# Patient Record
(demographics unavailable — no encounter records)

---

## 2022-06-22 RX ORDER — LOSARTAN POTASSIUM 100 MG/1
TABLET ORAL
COMMUNITY
Start: 2020-11-04

## 2022-06-22 RX ORDER — DEXLANSOPRAZOLE 60 MG/1
1 CAPSULE, DELAYED RELEASE ORAL
COMMUNITY
Start: 2013-01-25

## 2022-06-22 RX ORDER — GABAPENTIN 100 MG/1
1 CAPSULE ORAL
COMMUNITY
Start: 2021-10-14

## 2022-06-22 RX ORDER — DIVALPROEX SODIUM 500 MG/1
TABLET, DELAYED RELEASE ORAL
COMMUNITY

## 2022-06-22 RX ORDER — ATORVASTATIN CALCIUM 10 MG/1
TABLET, FILM COATED ORAL
COMMUNITY
Start: 2017-05-31

## 2022-06-22 RX ORDER — INSULIN GLARGINE 100 [IU]/ML
INJECTION, SOLUTION SUBCUTANEOUS
COMMUNITY
End: 2022-10-11 | Stop reason: SDUPTHER

## 2022-06-22 RX ORDER — INSULIN LISPRO 100 [IU]/ML
INJECTION, SOLUTION INTRAVENOUS; SUBCUTANEOUS
COMMUNITY
Start: 2021-08-02

## 2022-06-22 RX ORDER — MELOXICAM 7.5 MG/1
TABLET ORAL
COMMUNITY

## 2022-06-22 RX ORDER — INSULIN DEGLUDEC 200 U/ML
INJECTION, SOLUTION SUBCUTANEOUS
COMMUNITY
Start: 2021-05-05 | End: 2022-10-11 | Stop reason: SDUPTHER

## 2022-06-22 RX ORDER — DULOXETIN HYDROCHLORIDE 30 MG/1
1 CAPSULE, DELAYED RELEASE ORAL
COMMUNITY

## 2022-06-22 RX ORDER — INSULIN DETEMIR 100 [IU]/ML
INJECTION, SOLUTION SUBCUTANEOUS
COMMUNITY
Start: 2021-02-25

## 2022-06-22 RX ORDER — ATORVASTATIN CALCIUM 40 MG/1
TABLET, FILM COATED ORAL
COMMUNITY
Start: 2015-08-18

## 2022-06-22 RX ORDER — IBUPROFEN 800 MG/1
TABLET ORAL
COMMUNITY
Start: 2020-11-04

## 2022-06-22 RX ORDER — DULOXETIN HYDROCHLORIDE 60 MG/1
1 CAPSULE, DELAYED RELEASE ORAL
COMMUNITY
Start: 2013-12-05

## 2022-06-22 RX ORDER — GABAPENTIN 300 MG/1
3 CAPSULE ORAL
COMMUNITY

## 2022-06-22 RX ORDER — GABAPENTIN 800 MG/1
TABLET ORAL
COMMUNITY
Start: 2021-10-14

## 2022-06-22 RX ORDER — LAMOTRIGINE 100 MG/1
TABLET ORAL
COMMUNITY
Start: 2014-11-05

## 2022-06-22 RX ORDER — ATOMOXETINE 25 MG/1
CAPSULE ORAL
COMMUNITY

## 2022-06-22 RX ORDER — AMITRIPTYLINE HYDROCHLORIDE 10 MG/1
TABLET, FILM COATED ORAL
COMMUNITY
Start: 2014-11-12

## 2022-06-22 RX ORDER — INSULIN LISPRO 100 [IU]/ML
INJECTION, SOLUTION INTRAVENOUS; SUBCUTANEOUS
COMMUNITY
Start: 2020-12-28

## 2022-06-22 RX ORDER — FLUOXETINE HYDROCHLORIDE 20 MG/1
CAPSULE ORAL
COMMUNITY
Start: 2015-05-19

## 2022-06-22 RX ORDER — HYDROXYZINE PAMOATE 25 MG/1
CAPSULE ORAL
COMMUNITY

## 2022-06-22 RX ORDER — LISINOPRIL 5 MG/1
TABLET ORAL
COMMUNITY

## 2022-06-22 RX ORDER — HYDROXYZINE HYDROCHLORIDE 25 MG/1
TABLET, FILM COATED ORAL
COMMUNITY
Start: 2013-01-28

## 2022-06-22 RX ORDER — MIRTAZAPINE 30 MG/1
TABLET, FILM COATED ORAL
COMMUNITY
Start: 2022-03-29

## 2022-06-22 RX ORDER — INSULIN GLARGINE 100 [IU]/ML
INJECTION, SOLUTION SUBCUTANEOUS
COMMUNITY
Start: 2021-02-25

## 2022-06-22 RX ORDER — AMLODIPINE BESYLATE 5 MG/1
TABLET ORAL
COMMUNITY
Start: 2020-11-04

## 2022-06-28 RX ORDER — RISPERIDONE 1 MG/1
TABLET, FILM COATED ORAL
COMMUNITY

## 2022-06-28 RX ORDER — QUETIAPINE FUMARATE 25 MG/1
TABLET, FILM COATED ORAL
COMMUNITY

## 2022-06-28 RX ORDER — OXYCODONE AND ACETAMINOPHEN 10; 325 MG/1; MG/1
TABLET ORAL
COMMUNITY
Start: 2012-10-08

## 2022-06-28 RX ORDER — OMEPRAZOLE 40 MG/1
CAPSULE, DELAYED RELEASE ORAL
COMMUNITY
Start: 2020-11-04

## 2022-06-28 RX ORDER — OXCARBAZEPINE 150 MG/1
TABLET, FILM COATED ORAL
COMMUNITY
Start: 2014-11-05

## 2022-06-28 RX ORDER — OLANZAPINE 10 MG/1
TABLET ORAL
COMMUNITY
Start: 2018-12-06

## 2022-06-28 RX ORDER — OXYCODONE HYDROCHLORIDE 15 MG/1
TABLET ORAL
COMMUNITY
Start: 2012-11-13

## 2022-06-28 RX ORDER — OLANZAPINE AND FLUOXETINE 6; 25 MG/1; MG/1
CAPSULE ORAL
COMMUNITY
Start: 2021-04-16

## 2022-06-28 RX ORDER — OXCARBAZEPINE 300 MG/1
TABLET, FILM COATED ORAL
COMMUNITY

## 2022-07-02 RX ORDER — TIZANIDINE 4 MG/1
TABLET ORAL
COMMUNITY

## 2022-07-02 RX ORDER — TADALAFIL 5 MG/1
TABLET ORAL
COMMUNITY

## 2022-07-02 RX ORDER — ROSUVASTATIN CALCIUM 20 MG/1
TABLET, COATED ORAL
COMMUNITY
Start: 2015-06-25

## 2022-07-02 RX ORDER — ROSUVASTATIN CALCIUM 5 MG/1
TABLET, COATED ORAL
COMMUNITY
Start: 2018-03-27

## 2022-07-21 LAB
ANION GAP SERPL CALC-SCNC: 13 MMOL/L (ref 2–17)
BASOPHILS # BLD AUTO: 0.1 X10E3/MCL (ref 0–0.2)
BASOPHILS NFR BLD AUTO: 0.4 % (ref 0–2)
BUN SERPL-MCNC: 33 MG/DL (ref 6–20)
CALCIUM SERPL-MCNC: 8.6 MG/DL (ref 8.6–10)
CHLORIDE SERPL-SCNC: 105 MMOL/L (ref 98–107)
CREAT SERPL-MCNC: 1.9 MG/DL (ref 0.7–1.3)
DEPRECATED HCO3 PLAS-SCNC: 21 MMOL/L (ref 22–29)
EOSINOPHIL # BLD AUTO: 0.2 X10E3/MCL (ref 0–0.5)
EOSINOPHIL NFR BLD AUTO: 1.4 % (ref 0–7)
ERYTHROCYTE [DISTWIDTH] IN BLOOD BY AUTOMATED COUNT: 16.6 % (ref 11–16)
GFR SERPL CREATININE-BSD FRML MDRD: 42 ML/MIN/1.73M²
GLUCOSE SERPL-MCNC: 57 MG/DL (ref 70–99)
HCT VFR BLD AUTO: 25.4 % (ref 38–52)
HGB BLD-MCNC: 7.9 G/DL (ref 13–17.3)
IMMATURE GRANS (ABS): 0.11 X10E3/MCL (ref 0–0.06)
IMMATURE GRANULOCYTES: 0.8 % (ref 0–0.6)
LYMPHOCYTES # SPEC AUTO: 1.1 X10E3/MCL (ref 1–3.2)
LYMPHOCYTES NFR BLD AUTO: 7.8 % (ref 15–45)
MCH RBC QN AUTO: 25.3 PG (ref 27–34.5)
MCHC RBC AUTO-ENTMCNC: 31.1 G/DL (ref 32–36)
MCV RBC AUTO: 81.4 FL (ref 84–100)
MONOCYTES # BLD AUTO: 0.9 X10E3/MCL (ref 0.3–1)
MONOCYTES NFR BLD AUTO: 6.2 % (ref 4–12)
NEUTROPHILS # BLD AUTO: 11.6 X10E3/MCL (ref 1.6–7.3)
NEUTROPHILS NFR BLD AUTO: 83.4 % (ref 42–74)
OSMOLALITY SERPL CALC.SUM OF ELEC: 282 MOSM/KG (ref 270–287)
PLATELET # BLD AUTO: 444 X10E3/MCL (ref 140–440)
PMV BLD AUTO: 9.2 FL (ref 7.2–13.2)
POTASSIUM SERPL-SCNC: 4.2 MMOL/L (ref 3.5–5.3)
RBC # BLD AUTO: 3.12 X10E6/MCL (ref 4–5.6)
SODIUM SERPL-SCNC: 139 MMOL/L (ref 135–145)
WBC # BLD AUTO: 13.8 X10E3/MCL (ref 3.8–10.6)

## 2022-10-22 NOTE — ED NOTES
ED Patient Education Note     Patient Education Materials Follows:  Dermatology     How to Change Your Wound Dressing    A dressing is a material that is placed in and over a wound to help the wound heal. It protects the wound from bacteria, which could cause the wound to become infected. A dressing also protects the wound from further injury and keeps it from becoming too dry or too wet. There are several types of wound dressings. Some examples are:   Bandages. Gauze pads. Foam pads. Antimicrobial dressings. These dressings prevent or treat infection and may contain an antiseptic such as silver or iodine. Calcium alginate. These are general guidelines. Follow your health care provider's instructions about what dressing supplies to use and how to change your wound dressing. What are the risks? It is usually safe to use and change a dressing. The most common problem is skin irritation or a rash from the adhesive tape that is used with the dressing. However, more serious problems can develop, such as:   Bleeding. Infection. Supplies needed:    Set up a clean area for wound care. You will need:   A disposable garbage bag that is open and ready to use. Hand . Recommended cleaning solution as told by your health care provider, such as:  ? Germ-free (sterile) water. ? Sterile salt water (saline). ? Wound cleanser. New wound dressing material. Make sure to open the dressing package so the dressing remains on the inside of the package. You may also need the following in your clean area:   A box of vinyl gloves. Adhesive tape. Adhesive remover. Skin protectant. This may be a wipe, film, or spray. Clean or sterile scissors. A cotton-tipped applicator. How to change your dressing    How often you change your dressing will depend on your wound. Change the dressing as often as told by your health care provider.  Your health care provider may change your dressing, or a family member, friend, or caregiver may be shown how to change the dressing. It is important to: Wash your hands before and after each dressing change. If soap and water are not available, use hand . Wear gloves and protective clothing while changing a dressing. This may include eye protection. Never let anyone change your dressing if he or she has an infection, a skin condition, or a skin wound or cut of any size. Preparing to change your dressing     Take a shower before you do the first dressing change of the day. If your health care provider does not want your wound to get wet and your dressing is not waterproof, you may need to apply plastic leak-proof sealing wrap over your dressing for protection. If needed, take pain medicine 30 minutes before the dressing change as told by your health care provider. Removing your old dressing       Wash your hands with soap and water. Dry your hands with a clean towel. If soap and water are not available, use hand . Go to the clean area that you have set up with all the supplies you need. If you are using gloves, put the gloves on before you remove the dressing. Gently remove any adhesive or tape by pulling it off in the direction of your hair growth. Only touch the outside edges of the dressing. ? If you are told to use an adhesive remover to loosen the edges of the dressing, make sure to avoid the wound area. Take off the dressing. If the dressing sticks to your skin, use the recommended cleaning solution to wet the dressing. This helps it come off more easily. Remove any gauze or packing in your wound. Throw the old dressing supplies into the garbage bag. Remove each glove by grabbing the cuff with the opposite hand and turning the glove inside out. Place the gloves in the trash immediately. Wash your hands with soap and water.  Dry your hands with a clean towel. If soap and water are not available, use hand . Cleaning your wound     Follow instructions from your health care provider about how to clean your wound. This may include using the recommended cleaning solution. ? You may need to use sterile water to clean your wound if you are applying a dressing that has silver in it. Do not use over-the-counter medicated or antiseptic creams, sprays, liquids, or dressings unless told to do so by your health care provider. Use a clean gauze pad to clean the area thoroughly with the recommended cleaning solution. Throw the gauze pad into the garbage bag. Wash your hands with soap and water. Dry your hands with a clean towel. If soap and water are not available, use hand . Applying the wound dressing     If your health care provider recommended a skin protectant, apply it to the skin around the wound. Gently pack, if needed, and cover the wound with the recommended dressing. Make sure to touch only the outside edges of the dressing. Do not touch the inside of the dressing. Secure the dressing so all sides stay in place. You may do this with medical adhesive, roll gauze, or tape. If you use tape, do not wrap the tape all the way around your arm or leg. Take off your gloves. Put them into the garbage bag with the old dressing. Tie the bag shut and throw it away. Wash your hands with soap and water. Dry your hands with a clean towel. If soap and water are not available, use hand . Follow these instructions at home:    Wound care       Check your wound every day for signs of infection, or as often as told by your health care provider. Check for:  ? More redness, swelling, or pain. ? More fluid or blood. ? Warmth. ? Pus or a bad smell. General instructions     Take over-the-counter and prescription medicines only as told by your health care provider.      Ask your health care provider if the medicine prescribed to you:  ? Requires you to avoid driving or using heavy machinery. ? Can cause constipation. You may need to take actions to prevent or treat constipation, such as:  ? Drink enough fluid to keep your urine pale yellow. ? Take over-the-counter or prescription medicines. ? Eat foods that are high in fiber, such as beans, whole grains, and fresh fruits and vegetables. ? Limit foods that are high in fat and processed sugars, such as fried or sweet foods. Keep all follow-up visits as told by your health care provider. This is important. Contact a health care provider if:     You have new pain. You develop irritation, a rash, or itching around the wound or dressing. Changing your dressing causes pain or a lot of bleeding. Get help right away if you have:     Severe pain. Signs of infection, such as:  ? More redness, swelling, or pain. ? More fluid or blood. ? Warmth. ? Pus or a bad smell. ? Red streaks leading from the wound. ? A fever. Summary     A dressing is a material that is placed in and over a wound. A dressing helps your wound heal.     Wash your hands before and after each dressing change. If soap and water are not available, use hand . Follow your health care provider's instructions about what dressing supplies to use and how to change your wound dressing. Check your wound every day for signs of infection, or as often as told by your health care provider. Get help right away if you have severe pain or signs of infection. This information is not intended to replace advice given to you by your health care provider. Make sure you discuss any questions you have with your health care provider. Document Revised: 08/15/2019 Document Reviewed: 08/15/2019  [employer] Patient Education ?  [id number] [address].      Infectious Disease     Osteomyelitis, Adult      Bone infections, also called osteomyelitis,occur when bacteria or other germs get inside a bone. This can happen if you have an infection in another part of your body that spreads through your blood. It can also happen if you have a wound or a broken bone (fracture) that breaks the skin. A wound or a fracture can allow germs from your skin or from outside of your body to spread to your bone. Bone infections need to be treated quickly to:   Prevent bone damage. Prevent the infection from spreading to other areas of your body. What are the causes? Most bone infections are caused by bacteria. The most common bacteria is one found on the skin (staphylococcus). Bone infections can also be caused by other germs, such as viruses and funguses. What increases the risk? You are more likely to develop this condition if:   You recently had surgery, especially bone or joint surgery. You have had an injury, such as stepping on a nail or having a fracture that exposes bones through the skin. You have a long-term (chronic) disease, such as:  ? Diabetes. ? HIV (human immunodeficiency virus). ? Rheumatoid arthritis. ? Sickle cell anemia. ? Kidney disease that requires dialysis. You have a condition that affects your body's defense system (immune system) or you take medicines that block or weaken the immune system. You have a condition that reduces your blood flow. You have an artificial joint. You have had a joint or bone repaired with plates or screws. You use IV drugs. What are the signs or symptoms? Symptoms vary depending on the type and location of your infection. Common symptoms of bone infections include:   Fever and chills. Skin redness and warmth. Swelling. Pain and stiffness. Drainage of fluid or pus near the infection. How is this diagnosed? This condition may be diagnosed based on: Your symptoms and medical history.      A physical exam.     Tests, such as:  ? A sample of tissue, fluid, or blood taken to be examined under a microscope. ? Pus or discharge swabbed from a wound for testing. This is to identify the type of germs and to determine what type of medicine will kill them. ? Blood tests. Imaging studies, including X-rays, MRI, CT scan, bone scan, or ultrasound. How is this treated? Treatment for this condition depends on the cause and type of infection. Antibiotic medicines are usually the first treatment for a bone infection. This may be done in a hospital at first. You may have to continue IV antibiotics at home or take antibiotics by mouth for several weeks after that. Other treatments may include surgery to:   Remove dead or dying tissue from a bone. Remove an infected artificial joint. Remove infected plates or screws that were used to repair a broken bone. Follow these instructions at home:    Medicines     Take over-the-counter and prescription medicines as told by your health care provider. Finish all antibiotic medicine even if you start to feel better. Follow instructions from your health care provider about how to take IV antibiotics at home. You may need to have a nurse come to your home to give you the IV antibiotics. Managing pain, stiffness, and swelling      If directed, put ice on the affected area. To do this:   Put ice in a plastic bag. Place a towel between your skin and the bag. Leave the ice on for 20 minutes, 2?3 times a day. General instructions     Ask your health care provider if you have any restrictions on your activities. Do not use any products that contain nicotine or tobacco, such as cigarettes, e-cigarettes, and chewing tobacco. If you need help quitting, ask your health care provider. Keep all follow-up visits as told by your health care provider. This is important. How is this prevented? Wash your hands often to stop the spread of germs. Wash your hands for at least 20 seconds with soap and water. If soap and water are not available, use hand . Keep any open areas, cuts, or wounds clean. Apply a clean bandage after cleaning the area. Check wounds frequently for signs of infections. Signs of infection include redness, swelling, warmth, pus, or a bad smell. Wear proper footwear to avoid injuries to the feet. Contact a health care provider if:     You develop a fever or chills. You have redness, warmth, pain, or swelling that returns after treatment. Get help right away if:     You have rapid breathing or you have trouble breathing. You have chest pain. You cannot drink fluids or make urine. The affected area swells, changes color, or turns blue. You have numbness or severe pain in the affected area. These symptoms may represent a serious problem that is an emergency. Do not wait to see if the symptoms will go away. Get medical help right away. Call your local emergency services (911 in the [place] Do not drive yourself to the hospital.      Summary     Bone infections, also called osteomyelitis,occur when bacteria or other germs get inside a bone. You are more likely to get this type of infection if you have a condition that lowers your ability to fight infections. You are also likely to get this condition if you take medicines that block or weaken the immune system. Most bone infections are caused by bacteria. They can also be caused by other germs, such as viruses and funguses. Treatment for this condition usually starts with taking antibiotics. Further treatment depends on the cause and type of infection. This information is not intended to replace advice given to you by your health care provider. Make sure you discuss any questions you have with your health care provider. Document Revised: 03/04/2021 Document Reviewed: 03/04/2021  [employer] Patient Education ?  2021 [employer]

## 2022-10-22 NOTE — DISCHARGE SUMMARY
ED Clinical Summary                         [facility]  [address]  [place], [place], [address] [phone]           PERSON INFORMATION  Name: Natasha Paiz Age:  48 Years : 1968   Sex: Male Language: English PCP: Amita Qureshi, [address]   Marital Status:   Phone: [phone] Med Service: MED-Medicine   MRN:  [id number] Acct# [de-identified] Arrival: 2022 14:36:00   Visit Reason: Chills; Leg pain-swelling; Malaise; Chills; INFECTION IN STUMP Acuity: 3 LOS: 000 02:21   Address:      [address] [place] [address] DR Brooklynn Alvarado [address]  Diagnosis:      BKA stump complication  Printed Prescriptions: Allergies      No Known Allergies      Medications Administered During Visit:                  Medication Dose Route   ondansetron 4 mg IV Push       Patient Medication List:              atorvastatin (Lipitor 80 mg oral tablet) 1 Tabs Oral (given by mouth) Once a Day (at bedtime). famotidine (Pepcid 20 mg oral tablet) 1 Tabs Oral (given by mouth) every day. FLUoxetine (PROzac 40 mg oral capsule) 1 Capsules Oral (given by mouth) every day.  gabapentin (Neurontin 300 mg oral capsule) 3 Capsules Oral (given by mouth) 2 times a day. HYDROcodone-acetaminophen (Norco 325 mg-5 mg oral tablet) 1 Tabs Oral (given by mouth) every 6 hours as needed moderate pain (4-7). 40 MEDD. Refills: 0.  hydrOXYzine (hydrOXYzine hydrochloride 25 mg oral tablet) 1-2 tabs Oral (given by mouth) 3 times a day as needed for anxiety., SOUND ALIKE / LOOK ALIKE - VERIFY DRUG  insulin lispro (Admelog SoloStar 100 units/mL injectable solution) 10 Units Subcutaneous (under the skin) 3 times a day before meals. losartan (losartan 100 mg oral tablet) 1 Tabs Oral (given by mouth) every day.   meloxicam (meloxicam 7.5 mg oral tablet) 1 Tabs Oral (given by mouth) 2 times a day.  nalOXone (Narcan 4 mg/0.1 mL nasal spray) 4 Milligram Nasal (into the nose) every 2 minutes as needed overdose. 1 spray (4 mg) every 2-3 minutes until patient responds or EMS arrives. nicotine (nicotine 4 mg oral transmucosal gum) 1 Each Chewed every 2 hours as needed for smoking cessation. OLANZapine (OLANZapine 10 mg oral tablet) 1 Tabs Oral (given by mouth) every day. omeprazole (omeprazole 40 mg oral delayed release capsule) 1 Capsules Oral (given by mouth) every day. QUEtiapine (SEROquel 25 mg oral tablet) 2 Tabs Oral (given by mouth) every day., SOUND ALIKE / LOOK ALIKE - VERIFY DRUG  tiZANidine (tiZANidine 4 mg oral tablet) 1 Tabs Oral (given by mouth) 3 times a day. Major Tests and Procedures: The following procedures and tests were performed during your ED visit. COMMONPROCEDURES%>  COMMON PROCEDURESCOMMENTS%>          Laboratory Orders  Name Status Details   BMP Completed Blood, Stat, ST - Stat, 07/21/22 15:22:00 EDT, 07/21/22 15:22:00 EDT, Nurse HEIDI strange LINDSEY K-PA, Print label Y/N   CBCDIFF Completed Blood, Stat, ST - Stat, 07/21/22 15:22:00 EDT, 07/21/22 15:22:00 EDT, Nurse Edith strange, Print label Y/N               Radiology Orders  No radiology orders were placed. Patient Care Orders  Name Status Details   Discharge Patient Ordered 07/21/22 16:39:00 EDT   ED Assessment Adult Completed 07/21/22 14:41:36 EDT, 07/21/22 14:41:36 EDT   ED Secondary Triage Completed 07/21/22 14:41:36 EDT, 07/21/22 14:41:36 EDT   ED Tracking - MRSA Ordered 07/21/22 14:36:50 EDT, NOT VALID FOR pharmacy, laboratory, radiology. , 07/21/22 14:36:50 EDT   ED Triage Adult Completed 07/21/22 14:36:50 EDT, 07/21/22 14:36:50 EDT   POC-Blood Glucose Monitoring Ordered 07/21/22 15:22:00 EDT, Once, 07/21/22 15:22:00 EDT   Saline Lock Insert Completed 07/21/22 15:22:00 EDT, Once, 07/21/22 15:22:00 EDT   Wound Care Ordered 07/21/22 15:36:00 EDT, Once, 07/21/22 15:36:00 EDT, Dress wound(s)             PROVIDER INFORMATION               Provider Role Assigned Unassigned   HEIDI Geneva YE ED MidLevel 2022 14:54:20    Mary Brown RN, Altgogo Jones ED Nurse 2022 15:25:46        Attending Physician:  Jordan YE     Admit Doc  JUAQUIN GORDON     Consulting Doc       VITALS INFORMATION  Vital Sign Triage Latest   Temp Oral ORAL_1%>36.8 degC ORAL%>36.8 degC   Temp Temporal TEMPORAL_1%> TEMPORAL%>   Temp Intravascular INTRAVASCULAR_1%> INTRAVASCULAR%>   Temp Axillary AXILLARY_1%> AXILLARY%>   Temp Rectal RECTAL_1%> RECTAL%>   02 Sat 99 % 98 %   Respiratory Rate RATE_1%>15 br/min RATE%>16 br/min   Peripheral Pulse Rate PULSE RATE_1%>80 bpm PULSE RATE%>80 bpm   Apical Heart Rate HEART RATE_1%> HEART RATE%>   Blood Pressure BLOOD PRESSURE_1%>/ BLOOD PRESSURE_1%>93 mmHg BLOOD PRESSURE%>138 mmHg / BLOOD PRESSURE%>88 mmHg                 Immunizations      No Immunizations Documented This Visit          DISCHARGE INFORMATION   Discharge Disposition: H Outpt-Sent Home   Discharge Location:    Home   Discharge Date and Time:    2022 16:57:43   ED Checkout Date and Time:    2022 16:57:43     DEPART REASON INCOMPLETE INFORMATION               Depart Action Incomplete Reason   Interactive View/I&O Recently assessed               Problems      Active           Suicide risk              Smoking Status      10 or more cigarettes (1/2 pack or more)/day in last 30 days         PATIENT EDUCATION INFORMATION  Instructions:       Osteomyelitis, Adult; How to Change Your Wound Dressing     Follow up:                    With: Address: When:   Dr. Stuart Bro  Within 1 week   Comments: Follow-up with Dr. Daphney Alvarenga, our general surgeon or the surgeon you were referred to at [facility].        With: Address: When:   [address]  [phone] Business () Within 1 week           ED PROVIDER DOCUMENTATION     Patient:   Brandie Taveras            MRN: [id number]            FIN: [id number]               Age:   48 years     Sex:  Male     : 1968   Associated Diagnoses:   BKA stump complication   Author:   Richie YE      Basic Information   Time seen: Provider Seen ()   ED Provider/Time:    JUAQUIN GORDON / 07/21/2022 14:54  . Additional information: Chief Complaint from Nursing Triage Note   Chief Complaint  Chief Complaint: arrives with PICC in [place] r/t left BKA. treated at [facility]. states not feeling well. having chills, fever, sweats, and nausea (07/21/22 14:38:00). History of Present Illness   This is a 51-year-old male presenting today for chills, nausea and leg pain. Patient was actually recently seen here 2 days ago for similar complaints. He was admitted to [address] [place] [address] for osteomyelitis of his left lower extremity stump. At the time of his admission. So he was given the option of stump amputation versus outpatient IV antibiotics. It appears as though he decided to not proceed while admitted with amputation but there is back commended to follow-up with orthopedics outpatient. When he was here 2 days ago he states he would rather be seen at [facility] for this. He was given follow-up information for general surgery, records were sent over for this consultation. He states he has not spoken with Dr. Miguel Gordon yet but reports worsening pain, nausea and feeling weaker today. He has not had any fevers. He has been compliant with his IV antibiotics and his PICC line, vancomycin and Rocephin. .        Review of Systems   Constitutional symptoms:  No fever,    Respiratory symptoms:  No shortness of breath,    Cardiovascular symptoms:  No chest pain,    Gastrointestinal symptoms:  No vomiting,    Neurologic symptoms:  No headache, no dizziness. Additional review of systems information: All other systems reviewed and otherwise negative. Health Status   Allergies: Allergic Reactions (Selected)  No Known Allergies.    Medications:  (Selected)   Inpatient Medications  Ordered  Zofran: 4 mg, 2 mL, IV Push, Once  Prescriptions  Prescribed  Norco 325 mg-5 mg oral tablet: 1 tabs, Oral, q6hr, 40 MEDD, PRN: moderate pain (4-7), 15 tabs, 0 Refill(s)  Documented Medications  Documented  Admelog SoloStar 100 units/mL injectable solution: 10 units, Subcutaneous, TIDAC, 0 Refill(s)  Lipitor 80 mg oral tablet: 80 mg, 1 tabs, Oral, Once a Day (at bedtime), 0 Refill(s)  Narcan 4 mg/0.1 mL nasal spray: 4 mg, Nasal, q2min, 1 spray (4 mg) every 2-3 minutes until patient responds or EMS arrives, PRN: overdose, 1 EA, 0 Refill(s)  Neurontin 300 mg oral capsule: 900 mg, 3 caps, Oral, BID, 0 Refill(s)  OLANZapine 10 mg oral tablet: 10 mg, 1 tabs, Oral, Daily, 30 tabs, 0 Refill(s)  PROzac 40 mg oral capsule: 40 mg, 1 caps, Oral, Daily, 30 caps, 0 Refill(s)  Pepcid 20 mg oral tablet: 20 mg, 1 tabs, Oral, Daily, 30 tabs, 0 Refill(s)  SEROquel 25 mg oral tablet: 50 mg, 2 tabs, Oral, Daily, 0 Refill(s)  hydrOXYzine hydrochloride 25 mg oral tablet: 1-2 tabs, Oral, TID, PRN: for anxiety, 0 Refill(s)  losartan 100 mg oral tablet: 100 mg, 1 tabs, Oral, Daily, 30 tabs, 0 Refill(s)  meloxicam 7.5 mg oral tablet: 7.5 mg, 1 tabs, Oral, BID, 30 tabs, 0 Refill(s)  nicotine 4 mg oral transmucosal gum: 4 mg, 1 EA, Chewed, q2hr, PRN: for smoking cessation, 0 Refill(s)  omeprazole 40 mg oral delayed release capsule: 40 mg, 1 caps, Oral, Daily, 0 Refill(s)  tiZANidine 4 mg oral tablet: 4 mg, 1 tabs, Oral, TID, 0 Refill(s). Past Medical/ Family/ Social History   Medical history: Reviewed as documented in chart. Surgical history: Reviewed as documented in chart. Family history: Not significant. Social history: Reviewed as documented in chart. Problem list:    Active Problems (8)  BKA stump complication   CVA (cerebral vascular accident)   Diabetes   HLD (hyperlipidemia)   Hyperlipidemia   Hypertension   MRSA (methicillin resistant Staphylococcus aureus)   Suicide risk   .       Physical Examination               Vital Signs   Vital Signs 7/03/5062 57:96 EDT Systolic Blood Pressure 335 mmHg  HI    Diastolic Blood Pressure 93 mmHg  HI    Temperature Oral 36.8 degC    Heart Rate Monitored 81 bpm    Respiratory Rate 15 br/min    SpO2 99 %   . Measurements   7/21/2022 14:41 EDT Body Mass Index est neo 24.06 kg/m2    Body Mass Index Measured 24.06 kg/m2   7/21/2022 14:38 EDT Height/Length Measured 179 cm    Weight Dosing 77.1 kg   . General:  Alert, no acute distress. Skin:  Warm, dry, intact. Head:  Normocephalic, atraumatic. Neck:  Supple, trachea midline. Eye:  Normal conjunctiva. Cardiovascular:  Regular rate and rhythm, No murmur. Respiratory:  Lungs are clear to auscultation, respirations are non-labored, breath sounds are equal, Symmetrical chest wall expansion. Musculoskeletal:  Prior BKA of left lower extremity. Patient has an open wound measuring about 8 x 2 cm, there is no purulent drainage, no surrounding erythema. .   Neurological:  Alert and oriented to person, place, time, and situation, normal sensory observed, normal motor observed, normal speech observed. Psychiatric:  Cooperative, appropriate mood & affect. Medical Decision Making   Rationale:  PA/NP reviewed with co-signing physician: diagnosis and plan of care. Documents reviewed:  Emergency department nurses' notes, flowsheet, emergency department records, prior records, vital signs.     Results review:  Lab results : Lab View   7/21/2022 16:03 EDT Estimated Creatinine Clearance 47.12 mL/min   7/21/2022 15:36 EDT WBC 13.8 x10e3/mcL  HI    RBC 3.12 x10e6/mcL  LOW    Hgb 7.9 g/dL  LOW    HCT 25.4 %  LOW    MCV 81.4 fL  LOW    MCH 25.3 pg  LOW    MCHC 31.1 g/dL  LOW    RDW 16.6 %  HI    Platelet 548 [id number]/INR  HI    MPV 9.2 fL    Neutro Auto 83.4 %  HI    Neutro Absolute 11.6 x10e3/mcL  HI    Immature Grans Percent 0.8 %  HI    Immature Grans Absolute 0.11 x10e3/mcL  HI    Lymph Auto 7.8 %  LOW    Lymph Absolute 1.1 x10e3/mcL    Mono Auto 6.2 % Mono Absolute 0.9 x10e3/mcL    Eosinophil Percent 1.4 %    Eos Absolute 0.2 x10e3/mcL    Basophil Auto 0.4 %    Baso Absolute 0.1 x10e3/mcL    Sodium Lvl 139 mmol/L    Potassium Lvl 4.2 mmol/L    Chloride 105 mmol/L    CO2 21 mmol/L  LOW    Glucose Random 57 mg/dL  LOW    BUN 33 mg/dL  HI    Creatinine Lvl 1.9 mg/dL  HI    AGAP 13 mmol/L    Osmolality Calc 282 mOsm/kg    Calcium Lvl 8.6 mg/dL    eGFR 42 mL/min/1.73mÂ²  LOW   7/21/2022 14:41 EDT Estimated Creatinine Clearance 55.95 mL/min   . Reexamination/ Reevaluation   Notes: 1year-old male with a history of hypertension, CVA, polysubstance abuse, diabetes and chronic kidney disease presenting today for reevaluation of left lower extremity stump infection. Patient was admitted to [address] [place] [address] last week for osteomyelitis of lower left extremity stump. At the time patient declined amputation of the stump and was put on Vanco and Rocephin via PICC line. Patient reports that he decided he did not want the amputation, was given outpatient follow-up with orthopedics at [address] [place] [address]. He was seen here 2 days ago stating he wanted his care at [facility]. He was given general surgery follow-up however he has not seen them yet. States he has been nauseous, feeling weak. He is afebrile, hemodynamically stable arrival, comfortable, nontoxic-appearing. Stump with open wound, no purulent drainage or cellulitis evident. Patient's labs are similar to when he was last discharged from [facility] via comparison through [facility]. Pliant with his IV antibiotics via PICC line, states he has pain medication and nausea medicine at home. Strongly encouraged him to follow-up with his choice of surgeon, general surgery here or he can go back to his orthopedic surgeon at [address] [place] [address]. He agrees with this plan and stable for discharge. .      Impression and Plan   Diagnosis   BKA stump complication (LBS35-BY X92.3, Discharge, Medical)   Plan   Condition: Stable.     Disposition: Discharged: Time 7/21/2022 16:35:00, to home. Patient was given the following educational materials: How to Change Your Wound Dressing, Osteomyelitis, Adult. Follow up with: Erasto Mitchell Within 1 week; Dr. Estefani Bonilla Within 1 week Follow-up with Dr. Ami Amador, our general surgeon or the surgeon you were referred to at [facility]. .    Counseled: I had a detailed discussion with the patient and/or guardian regarding the historical points/exam findings supporting the discharge diagnosis and need for outpatient followup. Discussed the need to return to the ER if symptoms persist/worsen, or for any questions/concerns that arise at home.

## 2022-10-22 NOTE — ED NOTES
ED Patient Summary       ;          [facility]  [address], [place], [address] [place] [address]  [phone]  Discharge Instructions (Patient)  Ahmet Carey  :  1968                   MRN: [id number]                   FIN: [id number]  Reason For Visit: Chills; Leg pain-swelling; Malaise; Chills; INFECTION IN STUMP  Final Diagnosis: BKA stump complication     Visit Date: 2022 14:36:00  Address: [address] [place] [address]  Phone: [phone]     Emergency Department Providers:         Primary Physician:      Lamin Ace 61 would like to thank you for allowing us to assist you with your healthcare needs. The following includes patient education materials and information regarding your injury/illness. Follow-up Instructions: You were seen today on an emergency basis. Please contact your primary care doctor for a follow up appointment. If you received a referral to a specialist doctor, it is important you follow-up as instructed. It is important that you call your follow-up doctor to schedule and confirm the location of your next appointment. Your doctor may practice at multiple locations. The office location of your follow-up appointment may be different to the one written on your discharge instructions. If you do not have a primary care doctor, please call [phone] for help in finding a [place]. [facility] Provider. For help in finding a specialist doctor, please call [phone]. If your condition gets worse before your follow-up with your primary care doctor or specialist, please return to the Emergency Department. Coronavirus 2019 (COVID-19) Reminders:     Patients age 15 - 24, with parental consent, and over age 25 can make an appointment for a COVID-19 vaccine.  Patients can contact their [facility] Moss Physician [facility] doctors' offices to schedule an appointment to receive the COVID-19 vaccine. Patients who do not have a Marvia Hammans physician can call [phone] to schedule vaccination appointments. Follow Up Appointments:  Primary Care Provider:     Name: Nuvia Champagne, [address]     Phone:                  With: Address: When:   Dr. Derek Gomez  Within 1 week   Comments: Follow-up with Dr. Grover Siu, our general surgeon or the surgeon you were referred to at [facility]. With: Address: When:   [address]  [phone] Business ([employer] Within 1 week              [address] SERVICES%>             Medications that have not changed  Other Medications  atorvastatin (Lipitor 80 mg oral tablet) 1 Tabs Oral (given by mouth) Once a Day (at bedtime). Last Dose:____________________  famotidine (Pepcid 20 mg oral tablet) 1 Tabs Oral (given by mouth) every day. Last Dose:____________________  FLUoxetine (PROzac 40 mg oral capsule) 1 Capsules Oral (given by mouth) every day. Last Dose:____________________  gabapentin (Neurontin 300 mg oral capsule) 3 Capsules Oral (given by mouth) 2 times a day. Last Dose:____________________  HYDROcodone-acetaminophen (Norco 325 mg-5 mg oral tablet) 1 Tabs Oral (given by mouth) every 6 hours as needed moderate pain (4-7). 40 MEDD. Refills: 0. Last Dose:____________________  hydrOXYzine (hydrOXYzine hydrochloride 25 mg oral tablet) 1-2 tabs Oral (given by mouth) 3 times a day as needed for anxiety., SOUND ALIKE / LOOK ALIKE - VERIFY DRUG  Last Dose:____________________  insulin lispro (Admelog SoloStar 100 units/mL injectable solution) 10 Units Subcutaneous (under the skin) 3 times a day before meals. Last Dose:____________________  losartan (losartan 100 mg oral tablet) 1 Tabs Oral (given by mouth) every day. Last Dose:____________________  meloxicam (meloxicam 7.5 mg oral tablet) 1 Tabs Oral (given by mouth) 2 times a day.   Last Dose:____________________  nalOXone (Narcan 4 mg/0.1 mL nasal spray) 4 Milligram Nasal (into the nose) every 2 minutes as needed overdose. 1 spray (4 mg) every 2-3 minutes until patient responds or EMS arrives. Last Dose:____________________  nicotine (nicotine 4 mg oral transmucosal gum) 1 Each Chewed every 2 hours as needed for smoking cessation. Last Dose:____________________  OLANZapine (OLANZapine 10 mg oral tablet) 1 Tabs Oral (given by mouth) every day. Last Dose:____________________  omeprazole (omeprazole 40 mg oral delayed release capsule) 1 Capsules Oral (given by mouth) every day. Last Dose:____________________  QUEtiapine (SEROquel 25 mg oral tablet) 2 Tabs Oral (given by mouth) every day., SOUND ALIKE / LOOK ALIKE - VERIFY DRUG  Last Dose:____________________  tiZANidine (tiZANidine 4 mg oral tablet) 1 Tabs Oral (given by mouth) 3 times a day. Last Dose:____________________      Allergy Info: No Known Allergies     Discharge Additional Information          Discharge Patient 07/21/22 16:39:00 EDT      Patient Education Materials:        Osteomyelitis, Adult      Bone infections, also called osteomyelitis,occur when bacteria or other germs get inside a bone. This can happen if you have an infection in another part of your body that spreads through your blood. It can also happen if you have a wound or a broken bone (fracture) that breaks the skin. A wound or a fracture can allow germs from your skin or from outside of your body to spread to your bone. Bone infections need to be treated quickly to:   Prevent bone damage. Prevent the infection from spreading to other areas of your body. What are the causes? Most bone infections are caused by bacteria. The most common bacteria is one found on the skin (staphylococcus). Bone infections can also be caused by other germs, such as viruses and funguses. What increases the risk?     You are more likely to develop this condition if:   You recently had surgery, especially broken bone. Follow these instructions at home:    Medicines     Take over-the-counter and prescription medicines as told by your health care provider. Finish all antibiotic medicine even if you start to feel better. Follow instructions from your health care provider about how to take IV antibiotics at home. You may need to have a nurse come to your home to give you the IV antibiotics. Managing pain, stiffness, and swelling      If directed, put ice on the affected area. To do this:   Put ice in a plastic bag. Place a towel between your skin and the bag. Leave the ice on for 20 minutes, 2?3 times a day. General instructions     Ask your health care provider if you have any restrictions on your activities. Do not use any products that contain nicotine or tobacco, such as cigarettes, e-cigarettes, and chewing tobacco. If you need help quitting, ask your health care provider. Keep all follow-up visits as told by your health care provider. This is important. How is this prevented? Wash your hands often to stop the spread of germs. Wash your hands for at least 20 seconds with soap and water. If soap and water are not available, use hand . Keep any open areas, cuts, or wounds clean. Apply a clean bandage after cleaning the area. Check wounds frequently for signs of infections. Signs of infection include redness, swelling, warmth, pus, or a bad smell. Wear proper footwear to avoid injuries to the feet. Contact a health care provider if:     You develop a fever or chills. You have redness, warmth, pain, or swelling that returns after treatment. Get help right away if:     You have rapid breathing or you have trouble breathing. You have chest pain. You cannot drink fluids or make urine. The affected area swells, changes color, or turns blue. You have numbness or severe pain in the affected area.     These symptoms may represent a serious problem that is an emergency. Do not wait to see if the symptoms will go away. Get medical help right away. Call your local emergency services (911 in the [place] Do not drive yourself to the hospital.      Summary     Bone infections, also called osteomyelitis,occur when bacteria or other germs get inside a bone. You are more likely to get this type of infection if you have a condition that lowers your ability to fight infections. You are also likely to get this condition if you take medicines that block or weaken the immune system. Most bone infections are caused by bacteria. They can also be caused by other germs, such as viruses and funguses. Treatment for this condition usually starts with taking antibiotics. Further treatment depends on the cause and type of infection. This information is not intended to replace advice given to you by your health care provider. Make sure you discuss any questions you have with your health care provider. Document Revised: 03/04/2021 Document Reviewed: 03/04/2021  [employer] Patient Education ? [address] [place].       How to Change Your Wound Dressing    A dressing is a material that is placed in and over a wound to help the wound heal. It protects the wound from bacteria, which could cause the wound to become infected. A dressing also protects the wound from further injury and keeps it from becoming too dry or too wet. There are several types of wound dressings. Some examples are:   Bandages. Gauze pads. Foam pads. Antimicrobial dressings. These dressings prevent or treat infection and may contain an antiseptic such as silver or iodine. Calcium alginate. These are general guidelines. Follow your health care provider's instructions about what dressing supplies to use and how to change your wound dressing. What are the risks? It is usually safe to use and change a dressing.  The most common problem is skin irritation or a rash from the adhesive tape that is used with the dressing. However, more serious problems can develop, such as:   Bleeding. Infection. Supplies needed:    Set up a clean area for wound care. You will need:   A disposable garbage bag that is open and ready to use. Hand . Recommended cleaning solution as told by your health care provider, such as:  ? Germ-free (sterile) water. ? Sterile salt water (saline). ? Wound cleanser. New wound dressing material. Make sure to open the dressing package so the dressing remains on the inside of the package. You may also need the following in your clean area:   A box of vinyl gloves. Adhesive tape. Adhesive remover. Skin protectant. This may be a wipe, film, or spray. Clean or sterile scissors. A cotton-tipped applicator. How to change your dressing    How often you change your dressing will depend on your wound. Change the dressing as often as told by your health care provider. Your health care provider may change your dressing, or a family member, friend, or caregiver may be shown how to change the dressing. It is important to: Wash your hands before and after each dressing change. If soap and water are not available, use hand . Wear gloves and protective clothing while changing a dressing. This may include eye protection. Never let anyone change your dressing if he or she has an infection, a skin condition, or a skin wound or cut of any size. Preparing to change your dressing     Take a shower before you do the first dressing change of the day. If your health care provider does not want your wound to get wet and your dressing is not waterproof, you may need to apply plastic leak-proof sealing wrap over your dressing for protection. If needed, take pain medicine 30 minutes before the dressing change as told by your health care provider.       Removing your old dressing       Wash your hands with soap and water. Dry your hands with a clean towel. If soap and water are not available, use hand . Go to the clean area that you have set up with all the supplies you need. If you are using gloves, put the gloves on before you remove the dressing. Gently remove any adhesive or tape by pulling it off in the direction of your hair growth. Only touch the outside edges of the dressing. ? If you are told to use an adhesive remover to loosen the edges of the dressing, make sure to avoid the wound area. Take off the dressing. If the dressing sticks to your skin, use the recommended cleaning solution to wet the dressing. This helps it come off more easily. Remove any gauze or packing in your wound. Throw the old dressing supplies into the garbage bag. Remove each glove by grabbing the cuff with the opposite hand and turning the glove inside out. Place the gloves in the trash immediately. Wash your hands with soap and water. Dry your hands with a clean towel. If soap and water are not available, use hand . Cleaning your wound     Follow instructions from your health care provider about how to clean your wound. This may include using the recommended cleaning solution. ? You may need to use sterile water to clean your wound if you are applying a dressing that has silver in it. Do not use over-the-counter medicated or antiseptic creams, sprays, liquids, or dressings unless told to do so by your health care provider. Use a clean gauze pad to clean the area thoroughly with the recommended cleaning solution. Throw the gauze pad into the garbage bag. Wash your hands with soap and water. Dry your hands with a clean towel. If soap and water are not available, use hand . Applying the wound dressing     If your health care provider recommended a skin protectant, apply it to the skin around the wound.      Gently pack, if needed, and cover the wound with the recommended dressing. Make sure to touch only the outside edges of the dressing. Do not touch the inside of the dressing. Secure the dressing so all sides stay in place. You may do this with medical adhesive, roll gauze, or tape. If you use tape, do not wrap the tape all the way around your arm or leg. Take off your gloves. Put them into the garbage bag with the old dressing. Tie the bag shut and throw it away. Wash your hands with soap and water. Dry your hands with a clean towel. If soap and water are not available, use hand . Follow these instructions at home:    Wound care       Check your wound every day for signs of infection, or as often as told by your health care provider. Check for:  ? More redness, swelling, or pain. ? More fluid or blood. ? Warmth. ? Pus or a bad smell. General instructions     Take over-the-counter and prescription medicines only as told by your health care provider. Ask your health care provider if the medicine prescribed to you:  ? Requires you to avoid driving or using heavy machinery. ? Can cause constipation. You may need to take actions to prevent or treat constipation, such as:  ? Drink enough fluid to keep your urine pale yellow. ? Take over-the-counter or prescription medicines. ? Eat foods that are high in fiber, such as beans, whole grains, and fresh fruits and vegetables. ? Limit foods that are high in fat and processed sugars, such as fried or sweet foods. Keep all follow-up visits as told by your health care provider. This is important. Contact a health care provider if:     You have new pain. You develop irritation, a rash, or itching around the wound or dressing. Changing your dressing causes pain or a lot of bleeding. Get help right away if you have:     Severe pain. Signs of infection, such as:  ? More redness, swelling, or pain. ? More fluid or blood. ? Warmth. ? Pus or a bad smell. ? Red streaks leading from the wound. ? A fever. Summary     A dressing is a material that is placed in and over a wound. A dressing helps your wound heal.     Wash your hands before and after each dressing change. If soap and water are not available, use hand . Follow your health care provider's instructions about what dressing supplies to use and how to change your wound dressing. Check your wound every day for signs of infection, or as often as told by your health care provider. Get help right away if you have severe pain or signs of infection. This information is not intended to replace advice given to you by your health care provider. Make sure you discuss any questions you have with your health care provider. Document Revised: 08/15/2019 Document Reviewed: 08/15/2019  [employer] Patient Education ? [address] [place]      ---------------------------------------------------------------------------------------------------------------------  [facility] allows patients to review your COVID and other test results as well as discharge documents from any [facility]. [facility], Emergency Department, surgical center or outpatient lab. Test results are typically available 36 hours after the test is completed. [address] encourages you to self-enroll in the [facility] Patient Portal.     To begin your self-enrollment process, please visit [email or web address] Under [facility], click on Sign up now. NOTE: You must be 16 years and older to use [facility] Self-Enroll online. If you are a parent, caregiver, or guardian; you need an invite to access your childs or dependents health records. To obtain an invite, contact the Medical Records department at [phone] Monday through Friday, 8-4:30, select option 3 .  If we receive your call afterhours, we will return your call the next business day. If you have issues trying to create or access your account, contact [employer] at [phone] available 7 days a week 24 hours a day.      Comment:

## 2022-10-22 NOTE — ED NOTES
ED Triage Note       ED Triage Adult Entered On:  7/21/2022 14:41 EDT    Performed On:  7/21/2022 14:38 EDT by Cristina Cortés RN, Lana PARADA               Triage   Numeric Rating Pain Scale :   7   Chief Complaint :   arrives with PICC in [place] r/t left BKA. treated at [facility].  states not feeling well. having chills, fever, sweats, and nausea   [place] [facility] Mode of Arrival :   Private vehicle   Infectious Disease Documentation :   Document assessment   Temperature Oral :   36.8 degC(Converted to: 98.2 degF)    Heart Rate Monitored :   81 bpm   Respiratory Rate :   15 br/min   Systolic Blood Pressure :   142 mmHg (HI)    Diastolic Blood Pressure :   93 mmHg (HI)    SpO2 :   99 %   Oxygen Therapy :   Room air   Patient presentation :   None of the above   Chief Complaint or Presentation suggest infection :   No   Dosing Weight Obtained By :   Patient stated   Weight Dosing :   77.1 kg(Converted to: 170 lb 0 oz)    Height :   179 cm(Converted to: 5 ft 10 in)    Body Mass Index Dosing :   24 kg/m2   Lina Keys - 7/21/2022 14:38 EDT   DCP GENERIC CODE   Tracking Acuity :   3   Tracking Group :   ED [facility]   Linajose alfredo Keys - 7/21/2022 14:38 EDT   ED General Section :   Document assessment   Pregnancy Status :   N/A   ED Allergies Section :   Document assessment   ED Reason for Visit Section :   Document assessment   Lina Belvue - 7/21/2022 14:38 EDT   ID Risk Screen Symptoms   Recent Travel History :   No recent travel   TB Symptom Screen :   No symptoms   Last 90 days COVID-19 ID :   No   Close Contact with COVID-19 ID :   No   Last 14 days COVID-19 ID :   No   C. diff Symptom/History ID :   Neither of the above   Patient Pregnant :   None of the above   Cristina Cortés RN, Nancy PARADA - 7/21/2022 14:38 EDT   Allergies   (As Of: 7/21/2022 14:41:35 EDT)   Allergies (Active)   No Known Allergies  Estimated Onset Date:   Unspecified ; Created ByKristy Hogan RN, Teresa Valdez; Reaction Status:   Active ; Category:   Drug ; Substance:   No Known Allergies ; Type: Allergy ; Updated By:   Vamsi Bailey; Reviewed Date:   7/21/2022 14:41 EDT        Psycho-Social   Last 3 mo, thoughts killing self/others :   Patient denies   Right click within box for Suspected Abuse policy link. :   None   Feels Safe Where Live :   Yes   ED Behavioral Activity Rating Scale :   4 - Quiet and awake (normal level of activity)   Oniel Ley RN, Frieda Screen - 7/21/2022 14:38 EDT   ED Reason for Visit   (As Of: 7/21/2022 14:41:35 EDT)   Problems(Active)    BKA stump complication (SNOMED CT  :408764752 )  Name of Problem:   BKA stump complication ; Recorder:   Enrique Cornell RN; Confirmation:   Confirmed ; Classification:   Patient Stated ; Code:   [id number] ; Contributor System:   PowerChart ; Last Updated:   10/6/2020 17:22 EDT ; Life Cycle Date:   10/6/2020 ; Life Cycle Status:   Active ; Vocabulary:   SNOMED CT        CVA (cerebral vascular accident) (SNOMED CT  :740699358 )  Name of Problem:   CVA (cerebral vascular accident) ; Onset Date:   11/2021 ; Recorder:   Yue SKELTON; Confirmation:   Confirmed ; Classification:   Patient Stated ; Code:   [id number] ; Contributor System:   PowerChart ; Last Updated:   12/25/2021 20:39 EST ; Life Cycle Date:   12/25/2021 ; Life Cycle Status:   Active ; Vocabulary:   SNOMED CT        Diabetes (SNOMED CT  :890762345 )  Name of Problem:   Diabetes ; Recorder:   Enrique Cornell RN; Confirmation:   Confirmed ; Classification:   Patient Stated ; Code:   [id number] ; Contributor System:   PowerChart ; Last Updated:   10/6/2020 17:22 EDT ; Life Cycle Date:   10/6/2020 ; Life Cycle Status:   Active ; Vocabulary:   SNOMED CT        HLD (hyperlipidemia) (SNOMED CT  :98579327 )  Name of Problem:   HLD (hyperlipidemia) ; Recorder:   Nick Patino; Confirmation:   Confirmed ; Classification:   Patient Stated ; Code:   [id number] ; Contributor System:   PowerChart ;  Last Updated:   1/27/2022 10:29 EST ; Life Cycle Date:   1/27/2022 ; Life Cycle Status:   Active ; Vocabulary:   SNOMED CT        Hyperlipidemia (SNOMED CT  :12388428 )  Name of Problem:   Hyperlipidemia ; Recorder:   Loretta SKELTON; Confirmation:   Confirmed ; Classification:   Patient Stated ; Code:   [id number] ; Contributor System:   [employer] ; Last Updated:   12/25/2021 20:38 EST ; Life Cycle Date:   12/25/2021 ; Life Cycle Status:   Active ; Vocabulary:   SNOMED CT        Hypertension (SNOMED CT  :2318078138 )  Name of Problem:   Hypertension ; Recorder:   Sheila Bains RN, [facility]; Confirmation:   Confirmed ; Classification:   Patient Stated ; Code:   [id number] ; Contributor System:   PowerChart ; Last Updated:   4/21/2021 15:58 EDT ; Life Cycle Date:   4/21/2021 ; Life Cycle Status:   Active ; Vocabulary:   SNOMED CT        MRSA (methicillin resistant Staphylococcus aureus) (SNOMED CT  :5180476382 )  Name of Problem:   MRSA (methicillin resistant Staphylococcus aureus) ; Onset Date:   12/27/2021 ; Recorder:   Lillette Gitelman; Confirmation:   Confirmed ; Classification:   Patient Stated ; Code:   [id number] ; Contributor System:   PowerChart ; Last Updated:   12/29/2021 14:12 EST ; Life Cycle Status:   Active ; Vocabulary:   SNOMED CT   ; Comments:        12/27/2021 10:26 - Juju Torres  MRSA Alert-PCR      Suicide risk (SNOMED CT  :0823050663 )  Name of Problem:   Suicide risk ; Recorder:   SYSTEM,  SYSTEM; Confirmation:   Confirmed ; Classification:   Medical ; Code:   [id number] ; Last Updated:   6/25/2021 22:46 EDT ;  Life Cycle Date:   6/25/2021 ; Life Cycle Status:   Active ; Vocabulary:   SNOMED CT          Diagnoses(Active)    Chills  Date:   7/21/2022 ; Diagnosis Type:   Reason For Visit ; Confirmation:   Complaint of ; Clinical Dx:   Chills ; Classification:   Medical ; Clinical Service:   Non-Specified ; Code:   PNED ; Probability:   0 ; Diagnosis Code:   [id number]      Leg pain-swelling  Date: 7/21/2022 ; Diagnosis Type:   Reason For Visit ; Confirmation:   Complaint of ; Clinical Dx:   Leg pain-swelling ; Classification:   Medical ; Clinical Service:   Non-Specified ; Code:   PNED ; Probability:   0 ; Diagnosis Code:   [id number]      Malaise  Date:   7/21/2022 ; Diagnosis Type:   Reason For Visit ; Confirmation:   Complaint of ; Clinical Dx:   Malaise ; Classification:   Medical ; Clinical Service:   Non-Specified ; Code:   PNED ; Probability:   0 ; Diagnosis Code:   [id number]

## 2022-10-22 NOTE — ED PROVIDER NOTES
Chills        Patient:   Deisi Sierra            MRN: [id number]            FIN: [id number]               Age:   48 years     Sex:  Male     :  1968   Associated Diagnoses:   BKA stump complication   Author:   Cheryle Banks K-PA      Basic Information   Time seen: Provider Seen ()   ED Provider/Time:    JUAQUIN GORDON / 2022 14:54  . Additional information: Chief Complaint from Nursing Triage Note   Chief Complaint  Chief Complaint: arrives with PICC in [place] r/t left BKA. treated at [facility]. states not feeling well. having chills, fever, sweats, and nausea (22 14:38:00). History of Present Illness   This is a 60-year-old male presenting today for chills, nausea and leg pain. Patient was actually recently seen here 2 days ago for similar complaints. He was admitted to [address] [place] [address] for osteomyelitis of his left lower extremity stump. At the time of his admission. So he was given the option of stump amputation versus outpatient IV antibiotics. It appears as though he decided to not proceed while admitted with amputation but there is back commended to follow-up with orthopedics outpatient. When he was here 2 days ago he states he would rather be seen at [facility] for this. He was given follow-up information for general surgery, records were sent over for this consultation. He states he has not spoken with Dr. Teresa Walton yet but reports worsening pain, nausea and feeling weaker today. He has not had any fevers. He has been compliant with his IV antibiotics and his PICC line, vancomycin and Rocephin. .        Review of Systems   Constitutional symptoms:  No fever,    Respiratory symptoms:  No shortness of breath,    Cardiovascular symptoms:  No chest pain,    Gastrointestinal symptoms:  No vomiting,    Neurologic symptoms:  No headache, no dizziness. Additional review of systems information: All other systems reviewed and otherwise negative.       Health Status   Allergies: Allergic Reactions (Selected)  No Known Allergies. Medications:  (Selected)   Inpatient Medications  Ordered  Zofran: 4 mg, 2 mL, IV Push, Once  Prescriptions  Prescribed  Norco 325 mg-5 mg oral tablet: 1 tabs, Oral, q6hr, 40 MEDD, PRN: moderate pain (4-7), 15 tabs, 0 Refill(s)  Documented Medications  Documented  Admelog SoloStar 100 units/mL injectable solution: 10 units, Subcutaneous, TIDAC, 0 Refill(s)  Lipitor 80 mg oral tablet: 80 mg, 1 tabs, Oral, Once a Day (at bedtime), 0 Refill(s)  Narcan 4 mg/0.1 mL nasal spray: 4 mg, Nasal, q2min, 1 spray (4 mg) every 2-3 minutes until patient responds or EMS arrives, PRN: overdose, 1 EA, 0 Refill(s)  Neurontin 300 mg oral capsule: 900 mg, 3 caps, Oral, BID, 0 Refill(s)  OLANZapine 10 mg oral tablet: 10 mg, 1 tabs, Oral, Daily, 30 tabs, 0 Refill(s)  PROzac 40 mg oral capsule: 40 mg, 1 caps, Oral, Daily, 30 caps, 0 Refill(s)  Pepcid 20 mg oral tablet: 20 mg, 1 tabs, Oral, Daily, 30 tabs, 0 Refill(s)  SEROquel 25 mg oral tablet: 50 mg, 2 tabs, Oral, Daily, 0 Refill(s)  hydrOXYzine hydrochloride 25 mg oral tablet: 1-2 tabs, Oral, TID, PRN: for anxiety, 0 Refill(s)  losartan 100 mg oral tablet: 100 mg, 1 tabs, Oral, Daily, 30 tabs, 0 Refill(s)  meloxicam 7.5 mg oral tablet: 7.5 mg, 1 tabs, Oral, BID, 30 tabs, 0 Refill(s)  nicotine 4 mg oral transmucosal gum: 4 mg, 1 EA, Chewed, q2hr, PRN: for smoking cessation, 0 Refill(s)  omeprazole 40 mg oral delayed release capsule: 40 mg, 1 caps, Oral, Daily, 0 Refill(s)  tiZANidine 4 mg oral tablet: 4 mg, 1 tabs, Oral, TID, 0 Refill(s). Past Medical/ Family/ Social History   Medical history: Reviewed as documented in chart. Surgical history: Reviewed as documented in chart. Family history: Not significant. Social history: Reviewed as documented in chart.    Problem list:    Active Problems (8)  BKA stump complication   CVA (cerebral vascular accident)   Diabetes   HLD (hyperlipidemia)   Hyperlipidemia Hypertension   MRSA (methicillin resistant Staphylococcus aureus)   Suicide risk   . Physical Examination               Vital Signs   Vital Signs   7/18/0721 38:10 EDT Systolic Blood Pressure 438 mmHg  HI    Diastolic Blood Pressure 93 mmHg  HI    Temperature Oral 36.8 degC    Heart Rate Monitored 81 bpm    Respiratory Rate 15 br/min    SpO2 99 %   . Measurements   7/21/2022 14:41 EDT Body Mass Index est neo 24.06 kg/m2    Body Mass Index Measured 24.06 kg/m2   7/21/2022 14:38 EDT Height/Length Measured 179 cm    Weight Dosing 77.1 kg   . General:  Alert, no acute distress. Skin:  Warm, dry, intact. Head:  Normocephalic, atraumatic. Neck:  Supple, trachea midline. Eye:  Normal conjunctiva. Cardiovascular:  Regular rate and rhythm, No murmur. Respiratory:  Lungs are clear to auscultation, respirations are non-labored, breath sounds are equal, Symmetrical chest wall expansion. Musculoskeletal:  Prior BKA of left lower extremity. Patient has an open wound measuring about 8 x 2 cm, there is no purulent drainage, no surrounding erythema. .   Neurological:  Alert and oriented to person, place, time, and situation, normal sensory observed, normal motor observed, normal speech observed. Psychiatric:  Cooperative, appropriate mood & affect. Medical Decision Making   Rationale:  PA/NP reviewed with co-signing physician: diagnosis and plan of care. Documents reviewed:  Emergency department nurses' notes, flowsheet, emergency department records, prior records, vital signs.     Results review:  Lab results : Lab View   7/21/2022 16:03 EDT Estimated Creatinine Clearance 47.12 mL/min   7/21/2022 15:36 EDT WBC 13.8 x10e3/mcL  HI    RBC 3.12 x10e6/mcL  LOW    Hgb 7.9 g/dL  LOW    HCT 25.4 %  LOW    MCV 81.4 fL  LOW    MCH 25.3 pg  LOW    MCHC 31.1 g/dL  LOW    RDW 16.6 %  HI    Platelet 925 B14R2/FRW  HI    MPV 9.2 fL    Neutro Auto 83.4 %  HI    Neutro Absolute 11.6 x10e3/mcL  HI Immature Grans Percent 0.8 %  HI    Immature Grans Absolute 0.11 x10e3/mcL  HI    Lymph Auto 7.8 %  LOW    Lymph Absolute 1.1 x10e3/mcL    Mono Auto 6.2 %    Mono Absolute 0.9 x10e3/mcL    Eosinophil Percent 1.4 %    Eos Absolute 0.2 x10e3/mcL    Basophil Auto 0.4 %    Baso Absolute 0.1 x10e3/mcL    Sodium Lvl 139 mmol/L    Potassium Lvl 4.2 mmol/L    Chloride 105 mmol/L    CO2 21 mmol/L  LOW    Glucose Random 57 mg/dL  LOW    BUN 33 mg/dL  HI    Creatinine Lvl 1.9 mg/dL  HI    AGAP 13 mmol/L    Osmolality Calc 282 mOsm/kg    Calcium Lvl 8.6 mg/dL    eGFR 42 mL/min/1.73mÂ²  LOW   7/21/2022 14:41 EDT Estimated Creatinine Clearance 55.95 mL/min   . Reexamination/ Reevaluation   Notes: 1year-old male with a history of hypertension, CVA, polysubstance abuse, diabetes and chronic kidney disease presenting today for reevaluation of left lower extremity stump infection. Patient was admitted to [address] [place] [address] last week for osteomyelitis of lower left extremity stump. At the time patient declined amputation of the stump and was put on Vanco and Rocephin via PICC line. Patient reports that he decided he did not want the amputation, was given outpatient follow-up with orthopedics at [address] [place] [address]. He was seen here 2 days ago stating he wanted his care at [facility]. He was given general surgery follow-up however he has not seen them yet. States he has been nauseous, feeling weak. He is afebrile, hemodynamically stable arrival, comfortable, nontoxic-appearing. Stump with open wound, no purulent drainage or cellulitis evident. Patient's labs are similar to when he was last discharged from [facility] via comparison through [facility]. Pliant with his IV antibiotics via PICC line, states he has pain medication and nausea medicine at home. Strongly encouraged him to follow-up with his choice of surgeon, general surgery here or he can go back to his orthopedic surgeon at [address] [place] [address]. He agrees with this plan and stable for discharge. Altagracia Dickens

## 2022-10-22 NOTE — ED NOTES
ED Triage Note       ED Secondary Triage Entered On:  7/21/2022 16:03 EDT    Performed On:  7/21/2022 16:03 EDT by Melvi Mcclain RN, [facility] Information   Barriers to Learning :   None evident   Languages :   English   Last Tetanus :   Greater than 5 years   ED Home Meds Section :   Document assessment   [facility] ED Fall Risk Section :   Document assessment   ED Advance Directives Section :   Document assessment   ED Palliative Screen :   N/A (prefilled for <64yo)   Tyler Councilman - 7/21/2022 16:03 EDT   (As Of: 7/21/2022 16:03:50 EDT)   Problems(Active)    BKA stump complication (SNOMED CT  :085541555 )  Name of Problem:   BKA stump complication ; Recorder:   Klaudia Malik RN, Mary Leggett; Confirmation:   Confirmed ; Classification:   Patient Stated ; Code:   [id number] ; Contributor System:   PowerChart ; Last Updated:   10/6/2020 17:22 EDT ; Life Cycle Date:   10/6/2020 ; Life Cycle Status:   Active ; Vocabulary:   SNOMED CT        CVA (cerebral vascular accident) (SNOMED CT  :091119403 )  Name of Problem:   CVA (cerebral vascular accident) ; Onset Date:   11/2021 ; Recorder:   Gabino SKELTON; Confirmation:   Confirmed ; Classification:   Patient Stated ; Code:   [id number] ; Contributor System:   PowerChart ; Last Updated:   12/25/2021 20:39 EST ; Life Cycle Date:   12/25/2021 ; Life Cycle Status:   Active ; Vocabulary:   SNOMED CT        Diabetes (SNOMED CT  :657718674 )  Name of Problem:   Diabetes ; Recorder:   Klaudia Malik RN, Mary Leggett; Confirmation:   Confirmed ; Classification:   Patient Stated ; Code:   [id number] ; Contributor System:   PowerChart ; Last Updated:   10/6/2020 17:22 EDT ; Life Cycle Date:   10/6/2020 ; Life Cycle Status:   Active ; Vocabulary:   SNOMED CT        HLD (hyperlipidemia) (SNOMED CT  :06448935 )  Name of Problem:   HLD (hyperlipidemia) ; Recorder:   Paolo Carrington;  Confirmation:   Confirmed ; Classification:   Patient Stated ; Code:   [id number] ; Contributor System:   PowerChart ; Last Updated:   1/27/2022 10:29 EST ; Life Cycle Date:   1/27/2022 ; Life Cycle Status:   Active ; Vocabulary:   SNOMED CT        Hyperlipidemia (SNOMED CT  :42694296 )  Name of Problem:   Hyperlipidemia ; Recorder:   Carl SKELTON; Confirmation:   Confirmed ; Classification:   Patient Stated ; Code:   [id number] ; Contributor System:   [employer] ; Last Updated:   12/25/2021 20:38 EST ; Life Cycle Date:   12/25/2021 ; Life Cycle Status:   Active ; Vocabulary:   SNOMED CT        Hypertension (SNOMED CT  :8025710160 )  Name of Problem:   Hypertension ; Recorder:   Deb Soni RN, Vazquez Moreau; Confirmation:   Confirmed ; Classification:   Patient Stated ; Code:   [id number] ; Contributor System:   [employer] ; Last Updated:   4/21/2021 15:58 EDT ; Life Cycle Date:   4/21/2021 ; Life Cycle Status:   Active ; Vocabulary:   SNOMED CT        MRSA (methicillin resistant Staphylococcus aureus) (SNOMED CT  :2169218114 )  Name of Problem:   MRSA (methicillin resistant Staphylococcus aureus) ; Onset Date:   12/27/2021 ; Recorder:   Shyanne Gonzalez; Confirmation:   Confirmed ; Classification:   Patient Stated ; Code:   [id number] ; Contributor System:   PowerChart ; Last Updated:   12/29/2021 14:12 EST ; Life Cycle Status:   Active ; Vocabulary:   SNOMED CT   ; Comments:        12/27/2021 10:26 - Juju Torres  MRSA Alert-PCR      Suicide risk (SNOMED CT  :4157987600 )  Name of Problem:   Suicide risk ; Recorder:   SYSTEM,  SYSTEM; Confirmation:   Confirmed ; Classification:   Medical ; Code:   [id number] ; Last Updated:   6/25/2021 22:46 EDT ;  Life Cycle Date:   6/25/2021 ; Life Cycle Status:   Active ; Vocabulary:   SNOMED CT          Diagnoses(Active)    Chills  Date:   7/21/2022 ; Diagnosis Type:   Reason For Visit ; Confirmation:   Confirmed ; Clinical Dx:   Chills ; Classification:   Medical ; Clinical Service:   Emergency medicine ; Code:   PNED ; Probability:   0 ; Diagnosis Code: [id number]      Chills  Date:   7/21/2022 ; Diagnosis Type:   Reason For Visit ; Confirmation:   Complaint of ; Clinical Dx:   Chills ; Classification:   Medical ; Clinical Service:   Non-Specified ; Code:   PNED ; Probability:   0 ; Diagnosis Code:   [id number]      Leg pain-swelling  Date:   7/21/2022 ; Diagnosis Type:   Reason For Visit ; Confirmation:   Complaint of ; Clinical Dx:   Leg pain-swelling ; Classification:   Medical ; Clinical Service:   Non-Specified ; Code:   PNED ; Probability:   0 ; Diagnosis Code:   [id number]      Malaise  Date:   7/21/2022 ; Diagnosis Type:   Reason For Visit ; Confirmation:   Complaint of ; Clinical Dx:   Malaise ; Classification:   Medical ; Clinical Service:   Non-Specified ; Code:   PNED ; Probability:   0 ; Diagnosis Code:   [id number]             -    Procedure History   (As Of: 7/21/2022 16:03:50 EDT)     Anesthesia Minutes:   0 ; Procedure Name:   [address] - Below knee amputation ; Procedure Minutes:   0            [facility] Fall Risk Assessment Tool   Hx of falling last 3 months ED Fall :   No   Patient confused or disoriented ED Fall :   No   Patient intoxicated or sedated ED Fall :   No   Patient impaired gait ED Fall :   No   Use a mobility assistance device ED Fall :   No   Patient altered elimination ED Fall :   No   [facility] ED Fall Score :   0    Lacey Rowley - 7/21/2022 16:03 EDT   ED Advance Directive   Advance Directive :   No   Gagan Esparza - 7/21/2022 16:03 EDT   Med Hx   Medication List   (As Of: 7/21/2022 16:03:50 EDT)   Normal Order    ondansetron 2 mg/mL Inj Soln 2 mL  :   ondansetron 2 mg/mL Inj Soln 2 mL ; Status:   Completed ; Ordered As Mnemonic:   Zofran ; Simple Display Line:   4 mg, 2 mL, IV Push, Once ; Ordering Provider:   Art YE;  Catalog Code:   ondansetron ; Order Dt/Tm:   7/21/2022 15:22:14 EDT Prescription/Discharge Order    HYDROcodone-acetaminophen  :   HYDROcodone-acetaminophen ; Status:   Prescribed ; Ordered As Mnemonic:   Norco 325 mg-5 mg oral tablet ; Simple Display Line:   1 tabs, Oral, q6hr, 40 MEDD, PRN: moderate pain (4-7), 15 tabs, 0 Refill(s) ; Ordering Provider:   Lula HOWE; Catalog Code:   HYDROcodone-acetaminophen ; Order Dt/Tm:   12/30/2021 11:01:38 EST            Home Meds    omeprazole  :   omeprazole ; Status:   Documented ; Ordered As Mnemonic:   omeprazole 40 mg oral delayed release capsule ; Simple Display Line:   40 mg, 1 caps, Oral, Daily, 0 Refill(s) ; Catalog Code:   omeprazole ; Order Dt/Tm:   12/25/2021 20:36:51 EST          losartan  :   losartan ; Status:   Documented ; Ordered As Mnemonic:   losartan 100 mg oral tablet ; Simple Display Line:   100 mg, 1 tabs, Oral, Daily, 30 tabs, 0 Refill(s) ; Ordering Provider:   Neelam HOWE; Catalog Code:   losartan ; Order Dt/Tm:   12/25/2021 20:34:33 EST          meloxicam  :   meloxicam ; Status:   Documented ; Ordered As Mnemonic:   meloxicam 7.5 mg oral tablet ; Simple Display Line:   7.5 mg, 1 tabs, Oral, BID, 30 tabs, 0 Refill(s) ; Catalog Code:   meloxicam ; Order Dt/Tm:   12/25/2021 20:32:31 EST          tiZANidine  :   tiZANidine ; Status:   Documented ; Ordered As Mnemonic:   tiZANidine 4 mg oral tablet ; Simple Display Line:   4 mg, 1 tabs, Oral, TID, 0 Refill(s) ; Ordering Provider:   Lulu FARIAS; Catalog Code:   tiZANidine ; Order Dt/Tm:   11/10/2021 10:50:11 EST          FLUoxetine  :   FLUoxetine ; Status:   Documented ; Ordered As Mnemonic:   PROzac 40 mg oral capsule ; Simple Display Line:   40 mg, 1 caps, Oral, Daily, 30 caps, 0 Refill(s) ;  Catalog Code:   FLUoxetine ; Order Dt/Tm:   11/9/2021 12:17:55 EST          famotidine  :   famotidine ; Status:   Documented ; Ordered As Mnemonic:   Pepcid 20 mg oral tablet ; Simple Display Line:   20 mg, 1 tabs, Oral, Daily, 30 tabs, 0 Refill(s) ; Catalog Code:   famotidine ; Order Dt/Tm:   11/9/2021 12:15:19 EST          gabapentin  :   gabapentin ; Status:   Documented ; Ordered As Mnemonic:   Neurontin 300 mg oral capsule ; Simple Display Line:   900 mg, 3 caps, Oral, BID, 0 Refill(s) ; Catalog Code:   gabapentin ; Order Dt/Tm:   11/9/2021 12:15:05 EST          nalOXone  :   nalOXone ; Status:   Documented ; Ordered As Mnemonic:   Narcan 4 mg/0.1 mL nasal spray ; Simple Display Line:   4 mg, Nasal, q2min, 1 spray (4 mg) every 2-3 minutes until patient responds or EMS arrives, PRN: overdose, 1 EA, 0 Refill(s) ; Catalog Code:   nalOXone ; Order Dt/Tm:   11/9/2021 12:14:18 EST          nicotine  :   nicotine ; Status:   Documented ; Ordered As Mnemonic:   nicotine 4 mg oral transmucosal gum ; Simple Display Line:   4 mg, 1 EA, Chewed, q2hr, PRN: for smoking cessation, 0 Refill(s) ; Catalog Code:   nicotine ; Order Dt/Tm:   11/9/2021 12:14:27 EST          QUEtiapine  :   QUEtiapine ; Status:   Documented ; Ordered As Mnemonic:   SEROquel 25 mg oral tablet ; Simple Display Line:   50 mg, 2 tabs, Oral, Daily, 0 Refill(s) ; Catalog Code:   QUEtiapine ; Order Dt/Tm:   11/9/2021 12:14:42 EST ; Comment:   SOUND ALIKE / LOOK ALIKE - VERIFY DRUG          atorvastatin  :   atorvastatin ; Status:   Documented ; Ordered As Mnemonic:   Lipitor 80 mg oral tablet ; Simple Display Line:   80 mg, 1 tabs, Oral, Once a Day (at bedtime), 0 Refill(s) ; Catalog Code:   atorvastatin ; Order Dt/Tm:   11/9/2021 12:12:10 EST          hydrOXYzine  :   hydrOXYzine ; Status:   Documented ; Ordered As Mnemonic:   hydrOXYzine hydrochloride 25 mg oral tablet ; Simple Display Line:   1-2 tabs, Oral, TID, PRN: for anxiety, 0 Refill(s) ;  Catalog Code:   hydrOXYzine ; Order Dt/Tm:   11/9/2021 12:12:25 EST ; Comment:   SOUND ALIKE / LOOK ALIKE - VERIFY DRUG          OLANZapine  :   OLANZapine ; Status:   Documented ; Ordered As Mnemonic:   OLANZapine 10 mg oral tablet ; Simple